# Patient Record
Sex: FEMALE | Race: WHITE | NOT HISPANIC OR LATINO | Employment: OTHER | URBAN - METROPOLITAN AREA
[De-identification: names, ages, dates, MRNs, and addresses within clinical notes are randomized per-mention and may not be internally consistent; named-entity substitution may affect disease eponyms.]

---

## 2017-06-05 ENCOUNTER — TELEPHONE (OUTPATIENT)
Dept: FAMILY MEDICINE | Facility: CLINIC | Age: 32
End: 2017-06-05

## 2017-06-05 DIAGNOSIS — M54.81 OCCIPITAL NEURALGIA, UNSPECIFIED LATERALITY: Primary | ICD-10-CM

## 2017-06-05 NOTE — TELEPHONE ENCOUNTER
----- Message from Bertha Salcido MA sent at 6/5/2017  2:27 PM CDT -----   Provider needs to order it.  ----- Message -----  From: Kem Ulloa MA  Sent: 6/5/2017   2:22 PM  To: Bertha Salcido MA        ----- Message -----  From: Silvia Herrera  Sent: 6/5/2017  10:57 AM  To: Kem Ulloa MA    Referral:  Dr. Clifford Bellamy, Neurosurgeon, for Occipital Neuralgia; pt scheduled for pre op for brain surgery on 6/14/17    Dr. Bellamy, [phone]  Fax [phone]

## 2017-06-06 NOTE — TELEPHONE ENCOUNTER
Pt called back with more info regarding referral:  Dr. Shayne Bellamy, [address] [place]  [address], [phone]; fax [phone]; pt states she needs a referral through her insurance as well as from Dr. Ibrahim to Dr. Bellamy.

## 2017-06-21 ENCOUNTER — TELEPHONE (OUTPATIENT)
Dept: FAMILY MEDICINE | Facility: CLINIC | Age: 32
End: 2017-06-21

## 2017-06-21 DIAGNOSIS — R51.9 HEADACHE, CHRONIC DAILY: Primary | ICD-10-CM
